# Patient Record
Sex: MALE | Race: WHITE | NOT HISPANIC OR LATINO | Employment: STUDENT | URBAN - METROPOLITAN AREA
[De-identification: names, ages, dates, MRNs, and addresses within clinical notes are randomized per-mention and may not be internally consistent; named-entity substitution may affect disease eponyms.]

---
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
## 2018-10-29 ENCOUNTER — TELEPHONE (OUTPATIENT)
Dept: FAMILY MEDICINE | Facility: CLINIC | Age: 18
End: 2018-10-29

## 2018-10-29 ENCOUNTER — DOCUMENTATION ONLY (OUTPATIENT)
Dept: FAMILY MEDICINE | Facility: CLINIC | Age: 18
End: 2018-10-29

## 2018-10-29 NOTE — TELEPHONE ENCOUNTER
----- Message from Carrington Oden sent at 10/29/2018  9:44 AM CDT -----  Contact: pt mother lisa  Type:  Sooner Apoointment Request    Caller is requesting a sooner appointment.  Caller declined first available appointment listed below.  Caller will not accept being placed on the waitlist and is requesting a message be sent to doctor.    Name of Caller:  jono  When is the first available appointment?  11.5.18  Symptoms:  depressed lately / overwhelmed with school  Best Call Back Number:     Additional Information:  Pt is only available on fridays. Pt is able to see a NP or PA

## 2018-10-29 NOTE — PROGRESS NOTES
Pre-Visit Chart Review  For Appointment Scheduled on 11-2-18    Health Maintenance Due   Topic Date Due    Lipid Panel  1998    HPV Vaccines (1 - Male 3-dose series) 05/13/2009    TETANUS VACCINE  05/13/2016    Influenza Vaccine  08/01/2018

## 2018-10-29 NOTE — TELEPHONE ENCOUNTER
----- Message from Jeri Torres sent at 10/29/2018 12:47 PM CDT -----  Type:  Patient Returning Call    Who Called:  Mother (Rochelle)  Who Left Message for Patient:  Sue  Does the patient know what this is regarding?:  Scheduling soon appointment  Best Call Back Number:  [phone]  Additional Information:  Please leave message if no answer ()

## 2018-11-02 ENCOUNTER — OFFICE VISIT (OUTPATIENT)
Dept: FAMILY MEDICINE | Facility: CLINIC | Age: 18
End: 2018-11-02
Attending: FAMILY MEDICINE
Payer: COMMERCIAL

## 2018-11-02 VITALS
RESPIRATION RATE: 12 BRPM | OXYGEN SATURATION: 98 % | SYSTOLIC BLOOD PRESSURE: 140 MMHG | TEMPERATURE: 99 F | HEIGHT: 72 IN | DIASTOLIC BLOOD PRESSURE: 60 MMHG | HEART RATE: 64 BPM | BODY MASS INDEX: 24.36 KG/M2 | WEIGHT: 179.88 LBS

## 2018-11-02 DIAGNOSIS — F34.1 DYSTHYMIA: Primary | ICD-10-CM

## 2018-11-02 PROCEDURE — 99999 PR PBB SHADOW E&M-EST. PATIENT-LVL IV: CPT | Mod: PBBFAC,,, | Performed by: FAMILY MEDICINE

## 2018-11-02 PROCEDURE — 3008F BODY MASS INDEX DOCD: CPT | Mod: CPTII,S$GLB,, | Performed by: FAMILY MEDICINE

## 2018-11-02 PROCEDURE — 99213 OFFICE O/P EST LOW 20 MIN: CPT | Mod: S$GLB,,, | Performed by: FAMILY MEDICINE

## 2018-11-02 NOTE — PROGRESS NOTES
Subjective:       Patient ID: Delano Saha is a 18 y.o. male.    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  He apparently has lost his [employer] scholarship and is seeking a letter requesting reconsideration and reinstate mint on the basis of depression.  A pH Q 9 self assessed by the patient revealed a score of 4 with several days of feeling down and depressed, trouble falling or staying asleep, feeling tired and poor appetite.  Everything else was rated as not at all.  The patient does describe sleeping excessively and late in the day but still waking of fatigue.  His appetite has been increased and he has been overeating he is up 13 lb since his last visit with me.  He also describes having poor or no energy.  He does not have any suicidal ideation.  He does describe anhedonia with no feeling of enjoyment knee any activities and withdrawal from activities that he normally would enjoy.  He has no personal history of depression and has never been on any medication.  He would be open to counseling but apparently has not sought any at [employer] prior to the crisis brought on by loss of his scholarship.    History reviewed. No pertinent past medical history.    History reviewed. No pertinent surgical history.    Review of patient's family history indicates:  Problem: No Known Problems      Relation: Mother          Age of Onset: (Not Specified)  Problem: Hyperlipidemia      Relation: Father          Age of Onset: (Not Specified)  Problem: No Known Problems      Relation: Maternal Aunt          Age of Onset: (Not Specified)  Problem: Alcohol abuse      Relation: Maternal Uncle          Age of Onset: (Not Specified)  Problem: No Known Problems      Relation: Paternal Aunt          Age of Onset: (Not Specified)  Problem: Hyperlipidemia      Relation: Paternal Uncle          Age of Onset: (Not Specified)  Problem: Migraines      Relation: Maternal  Grandmother          Age of Onset: (Not Specified)  Problem: Heart disease      Relation: Maternal Grandfather          Age of Onset: (Not Specified)  Problem: Hyperlipidemia      Relation: Paternal Grandmother          Age of Onset: (Not Specified)  Problem: Arthritis      Relation: Paternal Grandmother          Age of Onset: (Not Specified)  Problem: No Known Problems      Relation: Paternal Grandfather          Age of Onset: (Not Specified)  Problem: Tuberculosis      Relation: Maternal Uncle          Age of Onset: (Not Specified)  Problem: Drug abuse      Relation: Maternal Uncle          Age of Onset: (Not Specified)    Social History    Tobacco Use      Smoking status: Never Smoker      Smokeless tobacco: Never Used    Alcohol use: Yes    Drug use: No    Current Outpatient Medications on File Prior to Visit:  multivitamin (ONE DAILY MULTIVITAMIN) per tablet, Take 1 tablet by mouth once daily., Disp: , Rfl:     No current facility-administered medications on file prior to visit.           Review of Systems   Constitutional: Positive for fatigue.   Psychiatric/Behavioral: Positive for decreased concentration, dysphoric mood and sleep disturbance. Negative for agitation, behavioral problems and confusion. The patient is not nervous/anxious.        Objective:      Physical Exam   Psychiatric: Judgment and thought content normal. His mood appears not anxious. His affect is blunt. His affect is not angry, not labile and not inappropriate. His speech is delayed. He is slowed. Cognition and memory are normal. He does not express impulsivity or inappropriate judgment. He does not exhibit a depressed mood. He is attentive.   Nursing note and vitals reviewed.      Assessment:       1. Dysthymia        Plan:       1. Dysthymia  PHQ-9 suggest counseling would be more in order and I don't see a need for medication.  More in depth testing might suggest some attention deficit but I don't see clear signs of that at  present.  Letter written for request to consider reinstatement of [employer] but I doubt he will be successful.  - Ambulatory referral to Psychology

## 2018-11-02 NOTE — PATIENT INSTRUCTIONS
Controlling High Blood Pressure  High blood pressure (hypertension) is often called the silent killer. This is because many people who have it dont know it. High blood pressure is defined as 140/90 mm Hg or higher. Know your blood pressure and remember to check it regularly. Doing so can save your life. Here are some things you can do to help control your blood pressure.    Choose heart-healthy foods  · Select low-salt, low-fat foods. Limit sodium intake to 2,400 mg per day or the amount suggested by your healthcare provider.  · Limit canned, dried, cured, packaged, and fast foods. These can contain a lot of salt.  · Eat 8 to 10 servings of fruits and vegetables every day.  · Choose lean meats, fish, or chicken.  · Eat whole-grain pasta, brown rice, and beans.  · Eat 2 to 3 servings of low-fat or fat-free dairy products.  · Ask your doctor about the DASH eating plan. This plan helps reduce blood pressure.  · When you go to a restaurant, ask that your meal be prepared with no added salt.  Maintain a healthy weight  · Ask your healthcare provider how many calories to eat a day. Then stick to that number.  · Ask your healthcare provider what weight range is healthiest for you. If you are overweight, a weight loss of only 3% to 5% of your body weight can help lower blood pressure. Generally, a good weight loss goal is to lose 10% of your body weight in a year.  · Limit snacks and sweets.  · Get regular exercise.  Get up and get active  · Choose activities you enjoy. Find ones you can do with friends or family. This includes bicycling, dancing, walking, and jogging.  · Park farther away from building entrances.  · Use stairs instead of the elevator.  · When you can, walk or bike instead of driving.  · [place] leaves, garden, or do household repairs.  · Be active at a moderate to vigorous level of physical activity for at least 40 minutes for a minimum of 3 to 4 days a week.   Manage stress  · Make time to relax and enjoy  life. Find time to laugh.  · Communicate your concerns with your loved ones and your healthcare provider.  · Visit with family and friends, and keep up with hobbies.  Limit alcohol and quit smoking  · Men should have no more than 2 drinks per day.  · Women should have no more than 1 drink per day.  · Talk with your healthcare provider about quitting smoking. Smoking significantly increases your risk for heart disease and stroke. Ask your healthcare provider about community smoking cessation programs and other options.  Medicines  If lifestyle changes arent enough, your healthcare provider may prescribe high blood pressure medicine. Take all medicines as prescribed. If you have any questions about your medicines, ask your healthcare provider before stopping or changing them.   Date Last Reviewed: 4/27/2016  © 9029-1686 [employer]. [address] [place] [address], [place] [address]. All rights reserved. This information is not intended as a substitute for professional medical care. Always follow your healthcare professional's instructions.

## 2020-01-31 ENCOUNTER — LAB VISIT (OUTPATIENT)
Dept: LAB | Facility: HOSPITAL | Age: 20
End: 2020-01-31
Attending: NURSE PRACTITIONER
Payer: COMMERCIAL

## 2020-01-31 ENCOUNTER — OFFICE VISIT (OUTPATIENT)
Dept: FAMILY MEDICINE | Facility: CLINIC | Age: 20
End: 2020-01-31
Payer: COMMERCIAL

## 2020-01-31 VITALS
BODY MASS INDEX: 23.65 KG/M2 | OXYGEN SATURATION: 98 % | WEIGHT: 174.63 LBS | HEIGHT: 72 IN | SYSTOLIC BLOOD PRESSURE: 142 MMHG | DIASTOLIC BLOOD PRESSURE: 76 MMHG | TEMPERATURE: 98 F | HEART RATE: 83 BPM

## 2020-01-31 DIAGNOSIS — Z13.220 SCREENING FOR LIPID DISORDERS: ICD-10-CM

## 2020-01-31 DIAGNOSIS — R10.12 LUQ PAIN: Primary | ICD-10-CM

## 2020-01-31 DIAGNOSIS — R10.12 LUQ PAIN: ICD-10-CM

## 2020-01-31 DIAGNOSIS — F41.9 ANXIETY: ICD-10-CM

## 2020-01-31 LAB
ALBUMIN SERPL BCP-MCNC: 4.5 G/DL (ref 3.5–5.2)
ALP SERPL-CCNC: 77 U/L (ref 55–135)
ALT SERPL W/O P-5'-P-CCNC: 16 U/L (ref 10–44)
AMYLASE SERPL-CCNC: 46 U/L (ref 20–110)
ANION GAP SERPL CALC-SCNC: 9 MMOL/L (ref 8–16)
AST SERPL-CCNC: 14 U/L (ref 10–40)
BASOPHILS # BLD AUTO: 0.02 K/UL (ref 0–0.2)
BASOPHILS NFR BLD: 0.4 % (ref 0–1.9)
BILIRUB SERPL-MCNC: 1.4 MG/DL (ref 0.1–1)
BUN SERPL-MCNC: 16 MG/DL (ref 6–20)
CALCIUM SERPL-MCNC: 9.7 MG/DL (ref 8.7–10.5)
CHLORIDE SERPL-SCNC: 103 MMOL/L (ref 95–110)
CHOLEST SERPL-MCNC: 188 MG/DL (ref 120–199)
CHOLEST/HDLC SERPL: 3.7 {RATIO} (ref 2–5)
CO2 SERPL-SCNC: 28 MMOL/L (ref 23–29)
CREAT SERPL-MCNC: 1 MG/DL (ref 0.5–1.4)
DIFFERENTIAL METHOD: NORMAL
EOSINOPHIL # BLD AUTO: 0.1 K/UL (ref 0–0.5)
EOSINOPHIL NFR BLD: 2.2 % (ref 0–8)
ERYTHROCYTE [DISTWIDTH] IN BLOOD BY AUTOMATED COUNT: 11.8 % (ref 11.5–14.5)
EST. GFR  (AFRICAN AMERICAN): >60 ML/MIN/1.73 M^2
EST. GFR  (NON AFRICAN AMERICAN): >60 ML/MIN/1.73 M^2
GLUCOSE SERPL-MCNC: 116 MG/DL (ref 70–110)
HCT VFR BLD AUTO: 43.1 % (ref 40–54)
HDLC SERPL-MCNC: 51 MG/DL (ref 40–75)
HDLC SERPL: 27.1 % (ref 20–50)
HGB BLD-MCNC: 14.6 G/DL (ref 14–18)
IMM GRANULOCYTES # BLD AUTO: 0.01 K/UL (ref 0–0.04)
IMM GRANULOCYTES NFR BLD AUTO: 0.2 % (ref 0–0.5)
LDLC SERPL CALC-MCNC: 116.6 MG/DL (ref 63–159)
LIPASE SERPL-CCNC: 14 U/L (ref 4–60)
LYMPHOCYTES # BLD AUTO: 1.2 K/UL (ref 1–4.8)
LYMPHOCYTES NFR BLD: 22.1 % (ref 18–48)
MCH RBC QN AUTO: 30.7 PG (ref 27–31)
MCHC RBC AUTO-ENTMCNC: 33.9 G/DL (ref 32–36)
MCV RBC AUTO: 91 FL (ref 82–98)
MONOCYTES # BLD AUTO: 0.5 K/UL (ref 0.3–1)
MONOCYTES NFR BLD: 9.2 % (ref 4–15)
NEUTROPHILS # BLD AUTO: 3.6 K/UL (ref 1.8–7.7)
NEUTROPHILS NFR BLD: 65.9 % (ref 38–73)
NONHDLC SERPL-MCNC: 137 MG/DL
NRBC BLD-RTO: 0 /100 WBC
PLATELET # BLD AUTO: 223 K/UL (ref 150–350)
PMV BLD AUTO: 10 FL (ref 9.2–12.9)
POTASSIUM SERPL-SCNC: 4.2 MMOL/L (ref 3.5–5.1)
PROT SERPL-MCNC: 7.7 G/DL (ref 6–8.4)
RBC # BLD AUTO: 4.75 M/UL (ref 4.6–6.2)
SODIUM SERPL-SCNC: 140 MMOL/L (ref 136–145)
TRIGL SERPL-MCNC: 102 MG/DL (ref 30–150)
WBC # BLD AUTO: 5.44 K/UL (ref 3.9–12.7)

## 2020-01-31 PROCEDURE — 80053 COMPREHEN METABOLIC PANEL: CPT

## 2020-01-31 PROCEDURE — 82150 ASSAY OF AMYLASE: CPT

## 2020-01-31 PROCEDURE — 99999 PR PBB SHADOW E&M-EST. PATIENT-LVL IV: ICD-10-PCS | Mod: PBBFAC,,, | Performed by: PHYSICIAN ASSISTANT

## 2020-01-31 PROCEDURE — 83690 ASSAY OF LIPASE: CPT

## 2020-01-31 PROCEDURE — 80061 LIPID PANEL: CPT

## 2020-01-31 PROCEDURE — 3008F PR BODY MASS INDEX (BMI) DOCUMENTED: ICD-10-PCS | Mod: CPTII,S$GLB,, | Performed by: PHYSICIAN ASSISTANT

## 2020-01-31 PROCEDURE — 84443 ASSAY THYROID STIM HORMONE: CPT

## 2020-01-31 PROCEDURE — 36415 COLL VENOUS BLD VENIPUNCTURE: CPT | Mod: PO

## 2020-01-31 PROCEDURE — 85025 COMPLETE CBC W/AUTO DIFF WBC: CPT

## 2020-01-31 PROCEDURE — 3008F BODY MASS INDEX DOCD: CPT | Mod: CPTII,S$GLB,, | Performed by: PHYSICIAN ASSISTANT

## 2020-01-31 PROCEDURE — 99999 PR PBB SHADOW E&M-EST. PATIENT-LVL IV: CPT | Mod: PBBFAC,,, | Performed by: PHYSICIAN ASSISTANT

## 2020-01-31 PROCEDURE — 99214 PR OFFICE/OUTPT VISIT, EST, LEVL IV, 30-39 MIN: ICD-10-PCS | Mod: S$GLB,,, | Performed by: PHYSICIAN ASSISTANT

## 2020-01-31 PROCEDURE — 99214 OFFICE O/P EST MOD 30 MIN: CPT | Mod: S$GLB,,, | Performed by: PHYSICIAN ASSISTANT

## 2020-01-31 RX ORDER — ESCITALOPRAM OXALATE 10 MG/1
10 TABLET ORAL DAILY
Qty: 30 TABLET | Refills: 2 | Status: SHIPPED | OUTPATIENT
Start: 2020-01-31 | End: 2020-10-16

## 2020-01-31 NOTE — PROGRESS NOTES
Subjective:       Patient ID: Delano Saha is a 19 y.o. male.    Chief Complaint: Discomfort in Stomach ((1 year)) and Anxiety ((1 year))    Patient presents for evaluation of abdominal discomfort.  He states that he has had abdominal discomfort in the left upper quadrant for approximately 1 year.  He states that the discomfort is becoming more frequent and intense.  He denies nausea or vomiting.  He states that he has occasional constipation but does not take any medication for this.  He denies having diarrhea.  Patient states that his appetite is normal but occasionally he has bouts of early satiety.  He states that his weight is stable.  Patient is also concerned about anxiety.  He states that his anxiety began approximately 1-1 and half years ago.  He was seen November of 2018 by PCP for about of depression.  Patient states that most of his anxiety revolves around his intense school work.  He states that when he is on break from school he does not have feelings of anxiety.  He feels increased worry, trouble relaxing, restlessness, and irritability.  He states that he has an active social life and is able to discuss his stressors with his family.  He has not tried any sort of counseling or formal therapy.  He exercises infrequently.  Patients patient medical/surgical, social and family histories have been reviewed       Review of Systems   Constitutional: Negative for activity change, appetite change, fatigue and unexpected weight change.   Respiratory: Negative for cough, chest tightness and shortness of breath.    Cardiovascular: Negative for chest pain, palpitations and leg swelling.   Gastrointestinal: Positive for abdominal pain. Negative for anal bleeding, blood in stool, constipation, diarrhea and nausea.   Endocrine: Negative for polydipsia and polyuria.   Genitourinary: Negative for difficulty urinating, frequency, hematuria and urgency.   Musculoskeletal: Negative for arthralgias.   Skin: Negative for  rash.   Neurological: Negative for dizziness and headaches.   Psychiatric/Behavioral: Positive for dysphoric mood. Negative for agitation, behavioral problems, confusion, decreased concentration, hallucinations, self-injury, sleep disturbance and suicidal ideas. The patient is nervous/anxious. The patient is not hyperactive.        Objective:      Physical Exam   Constitutional: He appears well-developed and well-nourished. He is cooperative. No distress.   HENT:   Head: Normocephalic and atraumatic.   Cardiovascular: Normal rate, regular rhythm and normal heart sounds.   Pulmonary/Chest: Effort normal and breath sounds normal.   Musculoskeletal:        Right lower leg: He exhibits no edema.        Left lower leg: He exhibits no edema.   Neurological: He is alert.   Skin: Skin is warm and dry. Capillary refill takes less than 2 seconds.   Psychiatric: Judgment and thought content normal. His speech is delayed. He is slowed. Cognition and memory are normal.   Flat affect       Assessment:       1. LUQ pain    2. Anxiety    3. Screening for lipid disorders        Plan:       Delano was seen today for discomfort in stomach and anxiety.    Diagnoses and all orders for this visit:    LUQ pain  -     Comprehensive metabolic panel; Future  -     Urinalysis; Future  -     CBC auto differential; Future  -     Lipase; Future  -     Amylase; Future  -     US Abdomen Complete; Future    Anxiety  -     TSH; Future  -     Comprehensive metabolic panel; Future    Screening for lipid disorders  -     Lipid panel; Future    Other orders  -     escitalopram oxalate (LEXAPRO) 10 MG tablet; Take 1 tablet (10 mg total) by mouth once daily.           Follow up for lab today, follow up me in 4-6 weeks .   DISCLAIMER: This note was prepared with [employer] voice recognition transcription software. Garbled syntax, mangled pronouns, and other bizarre constructions may be attributed to that software system

## 2020-02-01 LAB — TSH SERPL DL<=0.005 MIU/L-ACNC: 2.14 UIU/ML (ref 0.4–4)

## 2020-02-03 ENCOUNTER — TELEPHONE (OUTPATIENT)
Dept: FAMILY MEDICINE | Facility: CLINIC | Age: 20
End: 2020-02-03

## 2020-02-03 NOTE — TELEPHONE ENCOUNTER
----- Message from Liliana Valerio sent at 2/3/2020 12:45 PM CST -----  Contact: Rochelle rae  Type:  Patient Returning Call    Who Called:  Rochelle  Who Left Message for Patient:  Bernie Nguyen  Does the patient know what this is regarding?:  labs  Best Call Back Number:  [phone]  Additional Information:  Pls call to adv. She will not be able to answer until after 3:54pm. She is a teacher, She can answer at 2:02pm if she can call then

## 2020-02-03 NOTE — TELEPHONE ENCOUNTER
----- Message from Raul Hightower sent at 2/3/2020  1:39 PM CST -----  Contact: pt  Type:  Patient Returning Call    Who Called:  pt  Who Left Message for Patient:  unk  Does the patient know what this is regarding?:  no  Best Call Back Number:  [phone]  Additional Information:

## 2020-10-16 ENCOUNTER — OFFICE VISIT (OUTPATIENT)
Dept: FAMILY MEDICINE | Facility: CLINIC | Age: 20
End: 2020-10-16
Payer: COMMERCIAL

## 2020-10-16 VITALS
TEMPERATURE: 97 F | DIASTOLIC BLOOD PRESSURE: 68 MMHG | OXYGEN SATURATION: 97 % | HEIGHT: 72 IN | SYSTOLIC BLOOD PRESSURE: 132 MMHG | WEIGHT: 175.06 LBS | HEART RATE: 80 BPM | BODY MASS INDEX: 23.71 KG/M2

## 2020-10-16 DIAGNOSIS — F32.A DEPRESSION, UNSPECIFIED DEPRESSION TYPE: Primary | ICD-10-CM

## 2020-10-16 PROCEDURE — 99999 PR PBB SHADOW E&M-EST. PATIENT-LVL IV: ICD-10-PCS | Mod: PBBFAC,,, | Performed by: PHYSICIAN ASSISTANT

## 2020-10-16 PROCEDURE — 99214 PR OFFICE/OUTPT VISIT, EST, LEVL IV, 30-39 MIN: ICD-10-PCS | Mod: S$GLB,,, | Performed by: PHYSICIAN ASSISTANT

## 2020-10-16 PROCEDURE — 3008F BODY MASS INDEX DOCD: CPT | Mod: CPTII,S$GLB,, | Performed by: PHYSICIAN ASSISTANT

## 2020-10-16 PROCEDURE — 99999 PR PBB SHADOW E&M-EST. PATIENT-LVL IV: CPT | Mod: PBBFAC,,, | Performed by: PHYSICIAN ASSISTANT

## 2020-10-16 PROCEDURE — 99214 OFFICE O/P EST MOD 30 MIN: CPT | Mod: S$GLB,,, | Performed by: PHYSICIAN ASSISTANT

## 2020-10-16 PROCEDURE — 3008F PR BODY MASS INDEX (BMI) DOCUMENTED: ICD-10-PCS | Mod: CPTII,S$GLB,, | Performed by: PHYSICIAN ASSISTANT

## 2020-10-16 RX ORDER — SERTRALINE HYDROCHLORIDE 25 MG/1
25 TABLET, FILM COATED ORAL DAILY
Qty: 30 TABLET | Refills: 2 | Status: SHIPPED | OUTPATIENT
Start: 2020-10-16 | End: 2020-11-20 | Stop reason: SDUPTHER

## 2020-10-16 NOTE — PROGRESS NOTES
"Subjective:       Patient ID: Delano Saha is a 20 y.o. male.    Chief Complaint: Depression    Patient presents for evaluation of depression. States " I want to see about starting a medication for depression".   Last visit 10 months ago he was having symptoms of anxiety and we started lexapro.  He cannot recall if the medication helped or not.  He discussed symptoms of depression in 2018 with pcp and PHQ 9 score was only 4.      Review of Systems   Constitutional: Negative for activity change, appetite change and unexpected weight change.   Respiratory: Negative for shortness of breath.    Cardiovascular: Negative for chest pain.   Gastrointestinal: Negative for abdominal pain, diarrhea and nausea.   Neurological: Negative for dizziness, light-headedness and headaches.   Psychiatric/Behavioral: Positive for dysphoric mood. Negative for agitation, behavioral problems, confusion, decreased concentration, hallucinations, self-injury, sleep disturbance and suicidal ideas. The patient is nervous/anxious. The patient is not hyperactive.        Objective:      Physical Exam  Vitals signs reviewed.   Constitutional:       General: He is not in acute distress.     Appearance: He is well-developed.   Eyes:      General: No scleral icterus.     Conjunctiva/sclera: Conjunctivae normal.   Neck:      Vascular: No carotid bruit.   Cardiovascular:      Rate and Rhythm: Normal rate and regular rhythm.      Heart sounds: Normal heart sounds.   Pulmonary:      Effort: Pulmonary effort is normal.      Breath sounds: Normal breath sounds.   Abdominal:      General: Bowel sounds are normal.      Palpations: Abdomen is soft.      Tenderness: There is no abdominal tenderness.   Musculoskeletal:      Right lower leg: No edema.      Left lower leg: No edema.   Neurological:      Mental Status: He is alert.   Psychiatric:         Attention and Perception: Attention normal.         Mood and Affect: Mood normal. Affect is flat.         Speech: " Speech is delayed.         Behavior: Behavior is slowed. Behavior is cooperative.         Thought Content: Thought content normal. Thought content does not include homicidal or suicidal plan.         Judgment: Judgment normal.      Comments: phq 9 score is 17    Riki 7 score is 8         Assessment:       1. Depression, unspecified depression type        Plan:       Delano was seen today for depression.    Diagnoses and all orders for this visit:    Depression, unspecified depression type  -     Ambulatory referral/consult to Social Work; Future  -     sertraline (ZOLOFT) 25 MG tablet; Take 1 tablet (25 mg total) by mouth once daily.           Follow up for vv 4 weeks me .

## 2020-11-20 ENCOUNTER — TELEPHONE (OUTPATIENT)
Dept: FAMILY MEDICINE | Facility: CLINIC | Age: 20
End: 2020-11-20

## 2020-11-20 ENCOUNTER — OFFICE VISIT (OUTPATIENT)
Dept: FAMILY MEDICINE | Facility: CLINIC | Age: 20
End: 2020-11-20
Payer: COMMERCIAL

## 2020-11-20 VITALS
OXYGEN SATURATION: 98 % | TEMPERATURE: 98 F | HEIGHT: 72 IN | HEART RATE: 74 BPM | BODY MASS INDEX: 23.89 KG/M2 | WEIGHT: 176.38 LBS | RESPIRATION RATE: 18 BRPM | DIASTOLIC BLOOD PRESSURE: 68 MMHG | SYSTOLIC BLOOD PRESSURE: 130 MMHG

## 2020-11-20 DIAGNOSIS — F32.A DEPRESSION, UNSPECIFIED DEPRESSION TYPE: Primary | ICD-10-CM

## 2020-11-20 DIAGNOSIS — L30.9 DERMATITIS: ICD-10-CM

## 2020-11-20 PROCEDURE — 3008F PR BODY MASS INDEX (BMI) DOCUMENTED: ICD-10-PCS | Mod: CPTII,S$GLB,, | Performed by: FAMILY MEDICINE

## 2020-11-20 PROCEDURE — 1126F AMNT PAIN NOTED NONE PRSNT: CPT | Mod: S$GLB,,, | Performed by: FAMILY MEDICINE

## 2020-11-20 PROCEDURE — 3008F BODY MASS INDEX DOCD: CPT | Mod: CPTII,S$GLB,, | Performed by: FAMILY MEDICINE

## 2020-11-20 PROCEDURE — 99999 PR PBB SHADOW E&M-EST. PATIENT-LVL IV: CPT | Mod: PBBFAC,,, | Performed by: FAMILY MEDICINE

## 2020-11-20 PROCEDURE — 99214 OFFICE O/P EST MOD 30 MIN: CPT | Mod: S$GLB,,, | Performed by: FAMILY MEDICINE

## 2020-11-20 PROCEDURE — 99214 PR OFFICE/OUTPT VISIT, EST, LEVL IV, 30-39 MIN: ICD-10-PCS | Mod: S$GLB,,, | Performed by: FAMILY MEDICINE

## 2020-11-20 PROCEDURE — 99999 PR PBB SHADOW E&M-EST. PATIENT-LVL IV: ICD-10-PCS | Mod: PBBFAC,,, | Performed by: FAMILY MEDICINE

## 2020-11-20 PROCEDURE — 1126F PR PAIN SEVERITY QUANTIFIED, NO PAIN PRESENT: ICD-10-PCS | Mod: S$GLB,,, | Performed by: FAMILY MEDICINE

## 2020-11-20 RX ORDER — SERTRALINE HYDROCHLORIDE 50 MG/1
50 TABLET, FILM COATED ORAL DAILY
Qty: 30 TABLET | Refills: 1 | Status: SHIPPED | OUTPATIENT
Start: 2020-11-20 | End: 2021-01-14 | Stop reason: SDUPTHER

## 2020-11-20 NOTE — PATIENT INSTRUCTIONS
Nonspecific Dermatitis  Dermatitis is a skin rash caused by something that touches the skin and makes it irritated and inflamed.  Your skin may be red, swollen, dry, and may be cracked. Blisters may form and ooze. The rash will itch.  Dermatitis can form on the face and neck, backs of hands, forearms, genitals, and lower legs. Dermatitis is not passed from person to person.  Talk with your health care provider about what may have caused the rash. Common things that cause skin allergies are metal in jewelry, plants like poison ivy or poison oak, and certain skin care products. You will need to avoid the source of your rash in the future to prevent it from coming back. In some cases, the cause of the dermatitis may not be found.  Treatment is done to relieve itching and prevent the rash from coming back. The rash should go away in a few days to a few weeks.  Home care  The health care provider may prescribe medications to relieve swelling and itching. Follow all instructions when using these medications.  · Avoid anything that heats up your skin, such as hot showers or baths, or direct sunlight. This can make itching worse.  · Stay away from whatever you think caused the rash.  · Bathe in warm, not hot, water. Apply a moisturizing lotion after bathing to prevent dry skin.  · Avoid skin irritants such as wool or silk clothing, grease, oils, harsh soaps, and detergents.  · Apply cold compresses to soothe your sores to help relieve your symptoms. Do this for 30 minutes 3 to 4 times a day. You can make a cold compress by soaking a cloth in cold water. Squeeze out excess water. You can add colloidal oatmeal to the water to help reduce itching. For severe itching in a small area, apply an ice pack wrapped in a thin towel. Do this for 20 minutes 3 to 4 times a day.  · You can also help relieve large areas of itching by taking a lukewarm bath with colloidal oatmeal added to the water.  · Use hydrocortisone cream for redness  and irritation, unless another medicine was prescribed. You can also use benzocaine anesthetic cream or spray.  · Use oral diphenhydramine to help reduce itching. This is an antihistamine you can buy at drug and grocery stores. It can make you sleepy, so use lower doses during the daytime. Or you can use loratadine. This is an antihistamine that will not make you sleepy. Dont use diphenhydramine if you have glaucoma or have trouble urinating because of an enlarged prostate.  · Wash your hands or use an antibacterial gel often to prevent the spread of the rash.  Follow-up care  Follow up with your health care provider. Make an appointment with your health care provider if your symptoms do not get better in the next 1 to 2 weeks.  When to seek medical advice  Call your health care provider right away if any of these occur:  · Spreading of the rash to other parts of your body  · Severe swelling of your face, eyelids, mouth, throat or tongue  · Trouble urinating due to swelling in the genital area  · Fever of 100.4°F (38°C) or higher  · Redness or swelling that gets worse  · Pain that gets worse  · Foul-smelling fluid leaking from the skin  · Yellow-brown crusts on the open blisters  · Joint pain   Date Last Reviewed: 7/23/2014  © 3020-4327 [employer]. [address] [place] [address], [place] [address]. All rights reserved. This information is not intended as a substitute for professional medical care. Always follow your healthcare professional's instructions.

## 2020-11-20 NOTE — PROGRESS NOTES
Subjective:       Patient ID: Delano Saha is a 20 y.o. male.    Chief Complaint: Follow-up (4 week ) and Rash    This patient is new to me.  Delano presents to the clinic today for a 4 week follow up for depression. Patient also has a rash on his torso today. Patient states he has not changed anything recently except starting the medication. Patient arrived home from school in [place] today. Patient states he feels his medication is helping but would like the medication dosage increased.   Patient educated on plan of care, verbalized understanding.     Review of Systems   Constitutional: Negative for activity change, appetite change, chills, diaphoresis and fever.   HENT: Negative for congestion, ear pain, postnasal drip, sinus pressure, sneezing and sore throat.    Eyes: Negative for pain, discharge, redness and itching.   Respiratory: Negative for apnea, cough, chest tightness, shortness of breath and wheezing.    Cardiovascular: Negative for chest pain and leg swelling.   Gastrointestinal: Negative for abdominal distention, abdominal pain, constipation, diarrhea, nausea and vomiting.   Genitourinary: Negative for difficulty urinating, dysuria, flank pain and frequency.   Skin: Positive for rash. Negative for color change and wound.   Neurological: Negative for dizziness.   Psychiatric/Behavioral: Negative for sleep disturbance.       Patient Active Problem List   Diagnosis    Depression       Objective:      Physical Exam  Vitals signs reviewed.   Constitutional:       General: He is not in acute distress.     Appearance: Normal appearance. He is well-developed.   HENT:      Head: Normocephalic.      Nose: Nose normal.   Eyes:      Conjunctiva/sclera: Conjunctivae normal.      Pupils: Pupils are equal, round, and reactive to light.   Neck:      Musculoskeletal: Normal range of motion and neck supple.   Cardiovascular:      Rate and Rhythm: Normal rate.   Pulmonary:      Effort: Pulmonary effort is normal. No  respiratory distress.   Abdominal:      General: There is no distension.      Palpations: Abdomen is soft.      Tenderness: There is no abdominal tenderness.   Skin:     General: Skin is warm and dry.      Findings: Rash present.      Comments: Mild dermatitis rash noted to the trunk.   Neurological:      Mental Status: He is alert and oriented to person, place, and time.   Psychiatric:         Attention and Perception: Attention normal.         Mood and Affect: Mood normal. Affect is flat.         Speech: Speech normal.         Behavior: Behavior normal. Behavior is cooperative.         Thought Content: Thought content normal.         Cognition and Memory: Cognition normal.         Lab Results   Component Value Date    WBC 5.44 01/31/2020    HGB 14.6 01/31/2020    HCT 43.1 01/31/2020     01/31/2020    CHOL 188 01/31/2020    TRIG 102 01/31/2020    HDL 51 01/31/2020    ALT 16 01/31/2020    AST 14 01/31/2020     01/31/2020    K 4.2 01/31/2020     01/31/2020    CREATININE 1.0 01/31/2020    BUN 16 01/31/2020    CO2 28 01/31/2020    TSH 2.144 01/31/2020     The ASCVD Risk score (Veronica DC Jr., et al., 2013) failed to calculate for the following reasons:    The 2013 ASCVD risk score is only valid for ages 40 to 79    Assessment:       1. Depression, unspecified depression type    2. BMI 23.0-23.9, adult    3. Dermatitis        Plan:       Delano was seen today for follow-up and rash.    Diagnoses and all orders for this visit:    Depression, unspecified depression type  -     sertraline (ZOLOFT) 50 MG tablet; Take 1 tablet (50 mg total) by mouth once daily.    BMI 23.0-23.9, adult  Comments:  today 23.92  Continue healthy diet and regular exercise as tolerated.  Continue medications as prescribed.  Follow up with PCP    Dermatitis  OTC antihistamine therapy as discussed.    Follow up in about 1 month (around 12/20/2020), or if symptoms worsen or fail to improve.

## 2020-11-20 NOTE — TELEPHONE ENCOUNTER
----- Message from Hyacinth Rivas MA sent at 11/20/2020  8:14 AM CST -----  PT mom is requesting to R/s appt for today, PT is on his wait into town , and needs an afternoon appt, later   Please call back to discuss   Call back # [phone]

## 2020-11-20 NOTE — TELEPHONE ENCOUNTER
Mother Rochelle, in involvement of care, called to reschedule appointment. Appointment rescheduled to 11/20/2020 at 3:20 understanding verbalized.

## 2021-01-13 ENCOUNTER — TELEPHONE (OUTPATIENT)
Dept: FAMILY MEDICINE | Facility: CLINIC | Age: 21
End: 2021-01-13

## 2021-01-14 ENCOUNTER — OFFICE VISIT (OUTPATIENT)
Dept: FAMILY MEDICINE | Facility: CLINIC | Age: 21
End: 2021-01-14
Payer: COMMERCIAL

## 2021-01-14 VITALS
TEMPERATURE: 97 F | BODY MASS INDEX: 25.08 KG/M2 | DIASTOLIC BLOOD PRESSURE: 72 MMHG | RESPIRATION RATE: 18 BRPM | WEIGHT: 185.19 LBS | OXYGEN SATURATION: 98 % | HEIGHT: 72 IN | SYSTOLIC BLOOD PRESSURE: 118 MMHG | HEART RATE: 80 BPM

## 2021-01-14 DIAGNOSIS — E66.3 OVERWEIGHT (BMI 25.0-29.9): ICD-10-CM

## 2021-01-14 DIAGNOSIS — F32.A DEPRESSION, UNSPECIFIED DEPRESSION TYPE: ICD-10-CM

## 2021-01-14 DIAGNOSIS — S69.92XA INJURY OF LEFT HAND, INITIAL ENCOUNTER: Primary | ICD-10-CM

## 2021-01-14 PROCEDURE — 99999 PR PBB SHADOW E&M-EST. PATIENT-LVL IV: ICD-10-PCS | Mod: PBBFAC,,, | Performed by: FAMILY MEDICINE

## 2021-01-14 PROCEDURE — 99214 PR OFFICE/OUTPT VISIT, EST, LEVL IV, 30-39 MIN: ICD-10-PCS | Mod: S$GLB,,, | Performed by: FAMILY MEDICINE

## 2021-01-14 PROCEDURE — 1125F PR PAIN SEVERITY QUANTIFIED, PAIN PRESENT: ICD-10-PCS | Mod: S$GLB,,, | Performed by: FAMILY MEDICINE

## 2021-01-14 PROCEDURE — 3008F PR BODY MASS INDEX (BMI) DOCUMENTED: ICD-10-PCS | Mod: CPTII,S$GLB,, | Performed by: FAMILY MEDICINE

## 2021-01-14 PROCEDURE — 1125F AMNT PAIN NOTED PAIN PRSNT: CPT | Mod: S$GLB,,, | Performed by: FAMILY MEDICINE

## 2021-01-14 PROCEDURE — 3008F BODY MASS INDEX DOCD: CPT | Mod: CPTII,S$GLB,, | Performed by: FAMILY MEDICINE

## 2021-01-14 PROCEDURE — 99214 OFFICE O/P EST MOD 30 MIN: CPT | Mod: S$GLB,,, | Performed by: FAMILY MEDICINE

## 2021-01-14 PROCEDURE — 99999 PR PBB SHADOW E&M-EST. PATIENT-LVL IV: CPT | Mod: PBBFAC,,, | Performed by: FAMILY MEDICINE

## 2021-01-14 RX ORDER — SERTRALINE HYDROCHLORIDE 25 MG/1
25 TABLET, FILM COATED ORAL DAILY
Qty: 90 TABLET | Refills: 1 | Status: SHIPPED | OUTPATIENT
Start: 2021-01-14 | End: 2021-08-04

## 2021-02-10 ENCOUNTER — TELEPHONE (OUTPATIENT)
Dept: FAMILY MEDICINE | Facility: CLINIC | Age: 21
End: 2021-02-10

## 2021-02-10 DIAGNOSIS — S69.92XD INJURY OF LEFT HAND, SUBSEQUENT ENCOUNTER: Primary | ICD-10-CM

## 2021-06-21 ENCOUNTER — TELEPHONE (OUTPATIENT)
Dept: FAMILY MEDICINE | Facility: CLINIC | Age: 21
End: 2021-06-21

## 2021-06-21 ENCOUNTER — OFFICE VISIT (OUTPATIENT)
Dept: FAMILY MEDICINE | Facility: CLINIC | Age: 21
End: 2021-06-21
Payer: COMMERCIAL

## 2021-06-21 VITALS
SYSTOLIC BLOOD PRESSURE: 124 MMHG | DIASTOLIC BLOOD PRESSURE: 68 MMHG | BODY MASS INDEX: 24.81 KG/M2 | HEART RATE: 73 BPM | HEIGHT: 72 IN | OXYGEN SATURATION: 98 % | WEIGHT: 183.19 LBS | TEMPERATURE: 98 F

## 2021-06-21 DIAGNOSIS — Z00.00 ANNUAL PHYSICAL EXAM: Primary | ICD-10-CM

## 2021-06-21 DIAGNOSIS — H91.93 DECREASED HEARING OF BOTH EARS: ICD-10-CM

## 2021-06-21 DIAGNOSIS — R06.83 SNORING: ICD-10-CM

## 2021-06-21 DIAGNOSIS — R53.83 FATIGUE, UNSPECIFIED TYPE: ICD-10-CM

## 2021-06-21 DIAGNOSIS — Z23 NEED FOR TETANUS BOOSTER: ICD-10-CM

## 2021-06-21 DIAGNOSIS — H92.03 EAR DISCOMFORT, BILATERAL: ICD-10-CM

## 2021-06-21 PROCEDURE — 1126F AMNT PAIN NOTED NONE PRSNT: CPT | Mod: S$GLB,,, | Performed by: PHYSICIAN ASSISTANT

## 2021-06-21 PROCEDURE — 90714 TD VACCINE GREATER THAN OR EQUAL TO 7YO PRESERVATIVE FREE IM: ICD-10-PCS | Mod: S$GLB,,, | Performed by: PHYSICIAN ASSISTANT

## 2021-06-21 PROCEDURE — 99999 PR PBB SHADOW E&M-EST. PATIENT-LVL IV: CPT | Mod: PBBFAC,,, | Performed by: PHYSICIAN ASSISTANT

## 2021-06-21 PROCEDURE — 3008F BODY MASS INDEX DOCD: CPT | Mod: CPTII,S$GLB,, | Performed by: PHYSICIAN ASSISTANT

## 2021-06-21 PROCEDURE — 99395 PREV VISIT EST AGE 18-39: CPT | Mod: 25,S$GLB,, | Performed by: PHYSICIAN ASSISTANT

## 2021-06-21 PROCEDURE — 99395 PR PREVENTIVE VISIT,EST,18-39: ICD-10-PCS | Mod: 25,S$GLB,, | Performed by: PHYSICIAN ASSISTANT

## 2021-06-21 PROCEDURE — 90471 IMMUNIZATION ADMIN: CPT | Mod: S$GLB,,, | Performed by: PHYSICIAN ASSISTANT

## 2021-06-21 PROCEDURE — 3008F PR BODY MASS INDEX (BMI) DOCUMENTED: ICD-10-PCS | Mod: CPTII,S$GLB,, | Performed by: PHYSICIAN ASSISTANT

## 2021-06-21 PROCEDURE — 90714 TD VACC NO PRESV 7 YRS+ IM: CPT | Mod: S$GLB,,, | Performed by: PHYSICIAN ASSISTANT

## 2021-06-21 PROCEDURE — 99999 PR PBB SHADOW E&M-EST. PATIENT-LVL IV: ICD-10-PCS | Mod: PBBFAC,,, | Performed by: PHYSICIAN ASSISTANT

## 2021-06-21 PROCEDURE — 90471 TD VACCINE GREATER THAN OR EQUAL TO 7YO PRESERVATIVE FREE IM: ICD-10-PCS | Mod: S$GLB,,, | Performed by: PHYSICIAN ASSISTANT

## 2021-06-21 PROCEDURE — 1126F PR PAIN SEVERITY QUANTIFIED, NO PAIN PRESENT: ICD-10-PCS | Mod: S$GLB,,, | Performed by: PHYSICIAN ASSISTANT

## 2021-06-25 ENCOUNTER — TELEPHONE (OUTPATIENT)
Dept: FAMILY MEDICINE | Facility: CLINIC | Age: 21
End: 2021-06-25

## 2021-07-06 ENCOUNTER — TELEPHONE (OUTPATIENT)
Dept: FAMILY MEDICINE | Facility: CLINIC | Age: 21
End: 2021-07-06

## 2022-08-26 ENCOUNTER — OFFICE VISIT (OUTPATIENT)
Dept: FAMILY MEDICINE | Facility: CLINIC | Age: 22
End: 2022-08-26
Payer: COMMERCIAL

## 2022-08-26 VITALS
HEIGHT: 72 IN | TEMPERATURE: 99 F | BODY MASS INDEX: 25.53 KG/M2 | DIASTOLIC BLOOD PRESSURE: 64 MMHG | HEART RATE: 74 BPM | WEIGHT: 188.5 LBS | SYSTOLIC BLOOD PRESSURE: 122 MMHG | OXYGEN SATURATION: 97 %

## 2022-08-26 DIAGNOSIS — Q79.9 BONY ABNORMALITY: ICD-10-CM

## 2022-08-26 DIAGNOSIS — M25.572 ACUTE LEFT ANKLE PAIN: ICD-10-CM

## 2022-08-26 DIAGNOSIS — Z00.00 ANNUAL PHYSICAL EXAM: Primary | ICD-10-CM

## 2022-08-26 PROCEDURE — 3078F DIAST BP <80 MM HG: CPT | Mod: CPTII,S$GLB,, | Performed by: PHYSICIAN ASSISTANT

## 2022-08-26 PROCEDURE — 3008F PR BODY MASS INDEX (BMI) DOCUMENTED: ICD-10-PCS | Mod: CPTII,S$GLB,, | Performed by: PHYSICIAN ASSISTANT

## 2022-08-26 PROCEDURE — 3074F PR MOST RECENT SYSTOLIC BLOOD PRESSURE < 130 MM HG: ICD-10-PCS | Mod: CPTII,S$GLB,, | Performed by: PHYSICIAN ASSISTANT

## 2022-08-26 PROCEDURE — 99999 PR PBB SHADOW E&M-EST. PATIENT-LVL IV: CPT | Mod: PBBFAC,,, | Performed by: PHYSICIAN ASSISTANT

## 2022-08-26 PROCEDURE — 99395 PREV VISIT EST AGE 18-39: CPT | Mod: S$GLB,,, | Performed by: PHYSICIAN ASSISTANT

## 2022-08-26 PROCEDURE — 1159F PR MEDICATION LIST DOCUMENTED IN MEDICAL RECORD: ICD-10-PCS | Mod: CPTII,S$GLB,, | Performed by: PHYSICIAN ASSISTANT

## 2022-08-26 PROCEDURE — 99999 PR PBB SHADOW E&M-EST. PATIENT-LVL IV: ICD-10-PCS | Mod: PBBFAC,,, | Performed by: PHYSICIAN ASSISTANT

## 2022-08-26 PROCEDURE — 3008F BODY MASS INDEX DOCD: CPT | Mod: CPTII,S$GLB,, | Performed by: PHYSICIAN ASSISTANT

## 2022-08-26 PROCEDURE — 3078F PR MOST RECENT DIASTOLIC BLOOD PRESSURE < 80 MM HG: ICD-10-PCS | Mod: CPTII,S$GLB,, | Performed by: PHYSICIAN ASSISTANT

## 2022-08-26 PROCEDURE — 1159F MED LIST DOCD IN RCRD: CPT | Mod: CPTII,S$GLB,, | Performed by: PHYSICIAN ASSISTANT

## 2022-08-26 PROCEDURE — 3074F SYST BP LT 130 MM HG: CPT | Mod: CPTII,S$GLB,, | Performed by: PHYSICIAN ASSISTANT

## 2022-08-26 PROCEDURE — 99395 PR PREVENTIVE VISIT,EST,18-39: ICD-10-PCS | Mod: S$GLB,,, | Performed by: PHYSICIAN ASSISTANT

## 2022-08-26 NOTE — PROGRESS NOTES
Subjective:       Patient ID: Delano Saha is a 22 y.o. male.    Chief Complaint: Annual Exam and Ankle Injury (Right ankle injury)    Patient with past medical history of depression currently in remission not on medications presents for routine annual physical exam.  He has no other chronic medical conditions.  He is due for routine blood work.  He is complaining of acute left ankle pain.  He states that he everted his left ankle while skateboarding approximately 1 week ago.  Swelling has improved but pain persists.  Pain increases with activity.  He has tried nothing for the pain.  He also has bony abnormality of the right medial ankle.  He states that he has had multiple previous ankle injuries and has intermittent discomfort.  Patients patient medical/surgical, social and family histories have been reviewed       Ankle Injury   The incident occurred 5 to 7 days ago. The injury mechanism was an eversion injury. The pain is present in the left ankle. The quality of the pain is described as aching. The pain is mild. The pain has been intermittent since onset. Pertinent negatives include no inability to bear weight, loss of motion, loss of sensation, muscle weakness, numbness or tingling. He reports no foreign bodies present. The symptoms are aggravated by weight bearing and movement. He has tried nothing for the symptoms.     Review of Systems   Musculoskeletal: Positive for arthralgias and joint swelling.   Neurological: Negative for tingling and numbness.   All other systems reviewed and are negative.      Objective:      Physical Exam  Vitals reviewed.   Constitutional:       General: He is not in acute distress.     Appearance: Normal appearance. He is well-developed.   HENT:      Head: Normocephalic and atraumatic.      Right Ear: Tympanic membrane, ear canal and external ear normal.      Left Ear: Tympanic membrane and external ear normal.      Mouth/Throat:      Mouth: Mucous membranes are moist.       Pharynx: Oropharynx is clear.   Eyes:      Conjunctiva/sclera: Conjunctivae normal.   Cardiovascular:      Rate and Rhythm: Normal rate and regular rhythm.      Heart sounds: Normal heart sounds.   Pulmonary:      Effort: Pulmonary effort is normal.      Breath sounds: Normal breath sounds.   Abdominal:      General: Bowel sounds are normal.      Palpations: Abdomen is soft. Abdomen is not rigid.      Tenderness: There is no abdominal tenderness. There is no guarding or rebound. Negative signs include Salomon's sign and McBurney's sign.   Musculoskeletal:      Cervical back: No tenderness.      Right lower leg: No edema.      Left lower leg: No edema.      Right ankle: Deformity present. No tenderness. Decreased range of motion.      Right Achilles Tendon: Normal.      Left ankle: No swelling or ecchymosis. Tenderness present over the medial malleolus. Normal range of motion.      Left Achilles Tendon: Normal.      Right foot: Normal.      Left foot: Normal.   Skin:     General: Skin is warm and dry.      Capillary Refill: Capillary refill takes less than 2 seconds.   Neurological:      General: No focal deficit present.      Mental Status: He is alert.   Psychiatric:         Mood and Affect: Mood normal.         Behavior: Behavior is cooperative.         Thought Content: Thought content normal.         Assessment:       1. Annual physical exam    2. Acute left ankle pain    3. Bony abnormality, right ankle        Plan:       Delano was seen today for annual exam and ankle injury.    Diagnoses and all orders for this visit:    Annual physical exam  -      -     CBC Auto Differential  -     Comprehensive Metabolic Panel  -     Lipid Panel  -     Urinalysis  Further recommendations will be made based on results   Continue healthy well balanced diet and exercise     Acute left ankle pain  --     X-Ray Ankle Complete Bilateral    Bony abnormality, right ankle  - -     X-Ray Ankle Complete Bilateral           Follow up for  "lab at [facility], xray at [facility],  estab pcp in 6 mo-  1 year .           Documentation entered by me for this encounter may have been done in part using speech-recognition technology. Although I have made an effort to ensure accuracy, "sound like" errors may exist and should be interpreted in context.    "

## 2022-08-27 LAB
ALBUMIN SERPL-MCNC: 4.8 G/DL (ref 3.6–5.1)
ALBUMIN/GLOB SERPL: 1.8 (CALC) (ref 1–2.5)
ALP SERPL-CCNC: 65 U/L (ref 36–130)
ALT SERPL-CCNC: 23 U/L (ref 9–46)
APPEARANCE UR: CLEAR
AST SERPL-CCNC: 20 U/L (ref 10–40)
BASOPHILS # BLD AUTO: 20 CELLS/UL (ref 0–200)
BASOPHILS NFR BLD AUTO: 0.4 %
BILIRUB SERPL-MCNC: 1.8 MG/DL (ref 0.2–1.2)
BILIRUB UR QL STRIP: NEGATIVE
BUN SERPL-MCNC: 14 MG/DL (ref 7–25)
BUN/CREAT SERPL: ABNORMAL (CALC) (ref 6–22)
CALCIUM SERPL-MCNC: 10 MG/DL (ref 8.6–10.3)
CHLORIDE SERPL-SCNC: 102 MMOL/L (ref 98–110)
CHOLEST SERPL-MCNC: 210 MG/DL
CHOLEST/HDLC SERPL: 3.7 (CALC)
CO2 SERPL-SCNC: 33 MMOL/L (ref 20–32)
COLOR UR: YELLOW
CREAT SERPL-MCNC: 0.86 MG/DL (ref 0.6–1.24)
EGFR: 124 ML/MIN/1.73M2
EOSINOPHIL # BLD AUTO: 168 CELLS/UL (ref 15–500)
EOSINOPHIL NFR BLD AUTO: 3.3 %
ERYTHROCYTE [DISTWIDTH] IN BLOOD BY AUTOMATED COUNT: 11.4 % (ref 11–15)
GLOBULIN SER CALC-MCNC: 2.7 G/DL (CALC) (ref 1.9–3.7)
GLUCOSE SERPL-MCNC: 100 MG/DL (ref 65–99)
GLUCOSE UR QL STRIP: NEGATIVE
HCT VFR BLD AUTO: 43.8 % (ref 38.5–50)
HDLC SERPL-MCNC: 57 MG/DL
HGB BLD-MCNC: 15.3 G/DL (ref 13.2–17.1)
HGB UR QL STRIP: NEGATIVE
KETONES UR QL STRIP: NEGATIVE
LDLC SERPL CALC-MCNC: 137 MG/DL (CALC)
LEUKOCYTE ESTERASE UR QL STRIP: NEGATIVE
LYMPHOCYTES # BLD AUTO: 1362 CELLS/UL (ref 850–3900)
LYMPHOCYTES NFR BLD AUTO: 26.7 %
MCH RBC QN AUTO: 30.8 PG (ref 27–33)
MCHC RBC AUTO-ENTMCNC: 34.9 G/DL (ref 32–36)
MCV RBC AUTO: 88.3 FL (ref 80–100)
MONOCYTES # BLD AUTO: 505 CELLS/UL (ref 200–950)
MONOCYTES NFR BLD AUTO: 9.9 %
NEUTROPHILS # BLD AUTO: 3045 CELLS/UL (ref 1500–7800)
NEUTROPHILS NFR BLD AUTO: 59.7 %
NITRITE UR QL STRIP: NEGATIVE
NONHDLC SERPL-MCNC: 153 MG/DL (CALC)
PH UR STRIP: 7.5 [PH] (ref 5–8)
PLATELET # BLD AUTO: 219 THOUSAND/UL (ref 140–400)
PMV BLD REES-ECKER: 9.6 FL (ref 7.5–12.5)
POTASSIUM SERPL-SCNC: 4.5 MMOL/L (ref 3.5–5.3)
PROT SERPL-MCNC: 7.5 G/DL (ref 6.1–8.1)
PROT UR QL STRIP: NEGATIVE
RBC # BLD AUTO: 4.96 MILLION/UL (ref 4.2–5.8)
SODIUM SERPL-SCNC: 138 MMOL/L (ref 135–146)
SP GR UR STRIP: 1.02 (ref 1–1.03)
TRIGL SERPL-MCNC: 70 MG/DL
WBC # BLD AUTO: 5.1 THOUSAND/UL (ref 3.8–10.8)

## 2022-08-31 DIAGNOSIS — E80.6 HYPERBILIRUBINEMIA: Primary | ICD-10-CM

## 2022-09-01 ENCOUNTER — TELEPHONE (OUTPATIENT)
Dept: FAMILY MEDICINE | Facility: CLINIC | Age: 22
End: 2022-09-01
Payer: COMMERCIAL

## 2022-09-01 NOTE — TELEPHONE ENCOUNTER
Please let patient know that the xray of the left ankle showed a normal anatomical variation   No acute abnormality or evidence of previous  fractures in either ankle in noted   If he is having continued issues I would suggest podiatry consult

## 2023-05-04 ENCOUNTER — TELEPHONE (OUTPATIENT)
Dept: FAMILY MEDICINE | Facility: CLINIC | Age: 23
End: 2023-05-04
Payer: COMMERCIAL

## 2023-05-04 NOTE — TELEPHONE ENCOUNTER
Spoke to pt mom whom advised pt was recently at Urgent care in [place] [employer] and sent to ER at [facility] to rule out heart attack. Pt mother advised they did an EKG, labs and xrays. Attempted to retrieve records but need a release of records signed and medical records is closed. Transferred to house supervisor voicemail. Pt will sign ANEUDY at appt

## 2023-05-04 NOTE — TELEPHONE ENCOUNTER
----- Message from Rickey Clemente sent at 5/4/2023  3:33 PM CDT -----  Regarding: advice  Contact: DELANO LEAL [[id number]]  Type: Needs Medical Advice  Who Called:  Rochelle    Symptoms (please be specific):  na    How long has patient had these symptoms:  na    Pharmacy name and phone #:  na    Best Call Back Number: [phone]    Additional Information: [facility] has Delano's labs and X Rays. May be a torn muscle. Please call to advise.

## 2023-05-04 NOTE — TELEPHONE ENCOUNTER
LVM returning patient call. Made aware that 05/12/23 appt available @ 10:45am. Will book for this time & request return call if does not meet w/approval.

## 2023-05-04 NOTE — TELEPHONE ENCOUNTER
----- Message from Anni Kim sent at 5/4/2023 10:29 AM CDT -----  Contact: PT Mother at [phone]  Type:  Sooner Appointment Request    Caller is requesting a sooner appointment.  Caller declined first available appointment listed below.  Caller will not accept being placed on the waitlist and is requesting a message be sent to doctor.    Name of Caller:  PT Mother  When is the first available appointment?  N/A  Symptoms:  Chest and arm hurt and went to ER and got Xrays Dr need a F/U  Best Call Back Number:  [phone]  Additional Information:  Pt need to get a sooner appt.  5/12 is a best da off for wk.  Please call back to advise.

## 2023-05-04 NOTE — TELEPHONE ENCOUNTER
Patient navigator sent message that patient's mom was on phone w/another question. Requested call be transferred to this nurse. Call was never transferred after 6 min.

## 2023-05-12 ENCOUNTER — OFFICE VISIT (OUTPATIENT)
Dept: FAMILY MEDICINE | Facility: CLINIC | Age: 23
End: 2023-05-12
Payer: COMMERCIAL

## 2023-05-12 ENCOUNTER — TELEPHONE (OUTPATIENT)
Dept: FAMILY MEDICINE | Facility: CLINIC | Age: 23
End: 2023-05-12

## 2023-05-12 VITALS
DIASTOLIC BLOOD PRESSURE: 70 MMHG | HEIGHT: 72 IN | WEIGHT: 175.69 LBS | SYSTOLIC BLOOD PRESSURE: 128 MMHG | OXYGEN SATURATION: 99 % | BODY MASS INDEX: 23.8 KG/M2 | HEART RATE: 73 BPM | TEMPERATURE: 98 F

## 2023-05-12 DIAGNOSIS — Z09 FOLLOW-UP EXAM: Primary | ICD-10-CM

## 2023-05-12 DIAGNOSIS — R07.89 OTHER CHEST PAIN: ICD-10-CM

## 2023-05-12 PROCEDURE — 99999 PR PBB SHADOW E&M-EST. PATIENT-LVL IV: CPT | Mod: PBBFAC,,, | Performed by: PHYSICIAN ASSISTANT

## 2023-05-12 PROCEDURE — 93010 EKG 12-LEAD: ICD-10-PCS | Mod: S$GLB,,, | Performed by: INTERNAL MEDICINE

## 2023-05-12 PROCEDURE — 3074F PR MOST RECENT SYSTOLIC BLOOD PRESSURE < 130 MM HG: ICD-10-PCS | Mod: CPTII,S$GLB,, | Performed by: PHYSICIAN ASSISTANT

## 2023-05-12 PROCEDURE — 99213 OFFICE O/P EST LOW 20 MIN: CPT | Mod: S$GLB,,, | Performed by: PHYSICIAN ASSISTANT

## 2023-05-12 PROCEDURE — 3078F PR MOST RECENT DIASTOLIC BLOOD PRESSURE < 80 MM HG: ICD-10-PCS | Mod: CPTII,S$GLB,, | Performed by: PHYSICIAN ASSISTANT

## 2023-05-12 PROCEDURE — 3074F SYST BP LT 130 MM HG: CPT | Mod: CPTII,S$GLB,, | Performed by: PHYSICIAN ASSISTANT

## 2023-05-12 PROCEDURE — 1159F MED LIST DOCD IN RCRD: CPT | Mod: CPTII,S$GLB,, | Performed by: PHYSICIAN ASSISTANT

## 2023-05-12 PROCEDURE — 3008F BODY MASS INDEX DOCD: CPT | Mod: CPTII,S$GLB,, | Performed by: PHYSICIAN ASSISTANT

## 2023-05-12 PROCEDURE — 1159F PR MEDICATION LIST DOCUMENTED IN MEDICAL RECORD: ICD-10-PCS | Mod: CPTII,S$GLB,, | Performed by: PHYSICIAN ASSISTANT

## 2023-05-12 PROCEDURE — 99999 PR PBB SHADOW E&M-EST. PATIENT-LVL IV: ICD-10-PCS | Mod: PBBFAC,,, | Performed by: PHYSICIAN ASSISTANT

## 2023-05-12 PROCEDURE — 3078F DIAST BP <80 MM HG: CPT | Mod: CPTII,S$GLB,, | Performed by: PHYSICIAN ASSISTANT

## 2023-05-12 PROCEDURE — 93005 EKG 12-LEAD: ICD-10-PCS | Mod: S$GLB,,, | Performed by: PHYSICIAN ASSISTANT

## 2023-05-12 PROCEDURE — 3008F PR BODY MASS INDEX (BMI) DOCUMENTED: ICD-10-PCS | Mod: CPTII,S$GLB,, | Performed by: PHYSICIAN ASSISTANT

## 2023-05-12 PROCEDURE — 93010 ELECTROCARDIOGRAM REPORT: CPT | Mod: S$GLB,,, | Performed by: INTERNAL MEDICINE

## 2023-05-12 PROCEDURE — 93005 ELECTROCARDIOGRAM TRACING: CPT | Mod: S$GLB,,, | Performed by: PHYSICIAN ASSISTANT

## 2023-05-12 PROCEDURE — 99213 PR OFFICE/OUTPT VISIT, EST, LEVL III, 20-29 MIN: ICD-10-PCS | Mod: S$GLB,,, | Performed by: PHYSICIAN ASSISTANT

## 2023-05-12 RX ORDER — KETOROLAC TROMETHAMINE 10 MG/1
10 TABLET, FILM COATED ORAL 3 TIMES DAILY
COMMUNITY
Start: 2023-05-04

## 2023-05-12 NOTE — PROGRESS NOTES
Subjective:       Patient ID: Delano Saha is a 22 y.o. male.    Chief Complaint: ER follow up     Patient presents for ED follow up.  He went to [facility] ED for evaluation of left chest pain.  I do not have the records of the ED visit at the time of this note.  He states that he was told ekg, cxr, and lab were normal.  He was given toradol and was diagnosed with [facility] Chest pain.  He pain has since improved with the toradol.       Chest Pain   This is a new problem. The current episode started in the past 7 days. The onset quality is sudden. The problem occurs constantly. The problem has been gradually improving. The pain is present in the lateral region. The quality of the pain is described as dull (aching). Radiates to: left scapula and axilla. Pertinent negatives include no exertional chest pressure, irregular heartbeat, orthopnea, palpitations, PND or shortness of breath.   Review of Systems   Respiratory:  Negative for shortness of breath.    Cardiovascular:  Positive for chest pain. Negative for palpitations, orthopnea and PND.     Objective:      Physical Exam  Constitutional:       General: He is not in acute distress.     Appearance: He is well-developed.   HENT:      Head: Normocephalic and atraumatic.   Cardiovascular:      Rate and Rhythm: Normal rate and regular rhythm.      Heart sounds: Normal heart sounds.   Pulmonary:      Effort: Pulmonary effort is normal.      Breath sounds: Normal breath sounds.   Chest:      Chest wall: No tenderness.   Musculoskeletal:      Cervical back: Neck supple. No tenderness.      Right lower leg: No edema.      Left lower leg: No edema.   Skin:     General: Skin is warm and dry.      Capillary Refill: Capillary refill takes less than 2 seconds.   Neurological:      General: No focal deficit present.      Mental Status: He is alert.   Psychiatric:         Mood and Affect: Mood normal.         Behavior: Behavior normal. Behavior is cooperative.         Judgment:  "Judgment normal.       Assessment:       1. Follow-up exam    2. Other chest pain        Plan:       Delano was seen today for er follow up .    Diagnoses and all orders for this visit:    Follow-up exam  -     IN OFFICE EKG 12-LEAD (to Muse)    Other chest pain  -     IN OFFICE EKG 12-LEAD (to Muse)  -     Echo; Future     Obtain records from ED     Toradol prn   Seek care if reoccurs   Follow up for echo soon,  pcp to estb 3 mo.           Documentation entered by me for this encounter may have been done in part using speech-recognition technology. Although I have made an effort to ensure accuracy, "sound like" errors may exist and should be interpreted in context.     "

## 2023-05-12 NOTE — TELEPHONE ENCOUNTER
Spoke to pt advised he has not seen Dr. Ruiz in 5 years so would be a new pt. Pt is scheduled in August to re establish care. Offered pt a sooner appt and pt declined. Confirmed appt in August with pt. He will call back if need to reschedule sooner.

## 2023-05-12 NOTE — TELEPHONE ENCOUNTER
----- Message from Sherlyn Knight sent at 5/12/2023  2:28 PM CDT -----  Regarding: advice  Contact: Rochelle mother  Type: Needs Medical Advice  Who Called:  rochelle mother  Symptoms (please be specific):    How long has patient had these symptoms:    Pharmacy name and phone #:    Best Call Back Number: [phone]  Additional Information: Mother states when patient checked out they told him he needed to establish primary care.  Dr. Guzman is his primary care for the last 7 years.  Please call mom to advise.  Thanks!

## 2023-05-19 ENCOUNTER — TELEPHONE (OUTPATIENT)
Dept: FAMILY MEDICINE | Facility: CLINIC | Age: 23
End: 2023-05-19
Payer: COMMERCIAL

## 2023-05-19 NOTE — TELEPHONE ENCOUNTER
LVM for patient mother (Rochelle) as patient is a student in [place] & could be why he has been hard to get a hold of. Requested return call to schedule echo.

## 2023-05-19 NOTE — TELEPHONE ENCOUNTER
----- Message from Jessica Carrasquillo sent at 5/18/2023 12:59 PM CDT -----  Regarding: removing order  We have made 3 attempts to contact this pt to  schedule their ECHO and have been unable to reach them therefore we are removing this from our work queue. We just wanted to make you aware of this in case you wanted to reach out to the patient.     Thanks,   [facility] Centralized Scheduling

## 2023-05-19 NOTE — TELEPHONE ENCOUNTER
----- Message from Aman Edwards sent at 5/19/2023  9:12 AM CDT -----  Type:  Patient Returning Call    Who Called:  Mother/ Rochelle Maria A   Who Left Message for Patient:  Roro  Does the patient know what this is regarding?:    Best Call Back Number:  [phone]  Additional Information: